# Patient Record
Sex: FEMALE | Race: WHITE | NOT HISPANIC OR LATINO | Employment: FULL TIME | URBAN - METROPOLITAN AREA
[De-identification: names, ages, dates, MRNs, and addresses within clinical notes are randomized per-mention and may not be internally consistent; named-entity substitution may affect disease eponyms.]

---

## 2017-01-24 ENCOUNTER — OFFICE VISIT (OUTPATIENT)
Dept: OBSTETRICS AND GYNECOLOGY | Age: 32
End: 2017-01-24

## 2017-01-24 VITALS
DIASTOLIC BLOOD PRESSURE: 64 MMHG | HEIGHT: 63 IN | BODY MASS INDEX: 22.32 KG/M2 | SYSTOLIC BLOOD PRESSURE: 118 MMHG | WEIGHT: 126 LBS

## 2017-01-24 DIAGNOSIS — Z01.419 WELL WOMAN EXAM WITH ROUTINE GYNECOLOGICAL EXAM: Primary | ICD-10-CM

## 2017-01-24 DIAGNOSIS — N64.3 GALACTORRHEA: ICD-10-CM

## 2017-01-24 PROCEDURE — 99395 PREV VISIT EST AGE 18-39: CPT | Performed by: PHYSICIAN ASSISTANT

## 2017-01-24 NOTE — MR AVS SNAPSHOT
Liza Ko   2017 9:30 AM   Office Visit    Dept Phone:  [phone]   Encounter #:  [id number]    Provider:  EFREM Pollard   Department:  [facility]                Your Full Care Plan              Your Updated Medication List          This list is accurate as of: 17 10:03 AM.  Always use your most recent med list.                citalopram 40 MG tablet   Commonly known as:  CeleXA       CLARITIN 10 MG capsule   Generic drug:  Loratadine       montelukast 10 MG tablet   Commonly known as:  SINGULAIR       PRENATAL VITAMIN PO       PULMICORT IN               We Performed the Following     Prolactin       You Were Diagnosed With        Codes Comments    Well woman exam with routine gynecological exam    -  Primary ICD-10-CM: Z01.419  ICD-9-CM: V72.31     Galactorrhea     ICD-10-CM: O92.6  ICD-9-CM: 611.6       Instructions     None    Patient Instructions History      Upcoming Appointments     Visit Type Date Time Department    WELLNESS 2017  9:30 AM [facility] PIRADHA MENDOZA      [facility] Signup     [facility] allows you to send messages to your doctor, view your test results, renew your prescriptions, schedule appointments, and more. To sign up, go to [employer] and click on the Sign Up Now link in the New User? box. Enter your [employer]acon Activation Code exactly as it appears below along with the last four digits of your Social Security Number and your Date of Birth () to complete the sign-up process. If you do not sign up before the expiration date, you must request a new code.    Veacon Activation Code: [email or web address]  Expires: 2017 10:04 AM    If you have questions, you can email "[email or web address] or call [phone] to talk to our [employer] staff. Remember, [employer] is NOT to be used for urgent needs. For medical emergencies, dial 911.               Other Info from Your Visit           Your  "Appointments     Jan 30, 2018  9:15 AM EST   Wellness with David Pozo MD   [facility] (--)    [address] [facility] [address] [facility] [address] [phone]              Allergies     Mold Extract [Trichophyton]      Nuts      Other      Animal dander    Pollen Extract      Shellfish-derived Products        Reason for Visit     Gynecologic Exam annual exam.      Vital Signs     Blood Pressure Height Weight Last Menstrual Period Breastfeeding? Body Mass Index    118/64 63\" (160 cm) 126 lb (57.2 kg) 01/14/2017 (Exact Date) No 22.32 kg/m2    Smoking Status                   Never Smoker           Problems and Diagnoses Noted     Well woman exam with routine gynecological exam    -  Primary    Milk overproduction            "

## 2017-01-24 NOTE — PROGRESS NOTES
"Subjective     Chief Complaint   Patient presents with   • Gynecologic Exam     annual exam.       History of Present Illness    Liza Ko is a 31 y.o.  who presents for annual exam.  She is doing well.  Completed nursing in the fall, still has occl nipple d/c, milky/yellow.  Does not express it.  Did have HA but those have resolved, no vision changes.  Son is good, on allergy shots    She is in a same sex relationship so does not require contraception    Her menses are regular every 28-30 days, lasting 4-7 days, dysmenorrhea none   Obstetric History:  OB History      Para Term  AB TAB SAB Ectopic Multiple Living    1 1 1       1         Menstrual History:     Patient's last menstrual period was 2017 (exact date).         Current contraception: same sex relationship  History of abnormal Pap smear: no  Received Gardasil immunization: no  Perform regular self breast exam: yes - mthly  Family history of uterine or ovarian cancer: no  Family History of colon cancer: no  Family history of breast cancer: no    Mammogram: not indicated.  Colonoscopy: not indicated.  DEXA: not indicated.    Exercise: moderately active  Calcium/Vitamin D: adequate intake    The following portions of the patient's history were reviewed and updated as appropriate: allergies, current medications, past family history, past medical history, past social history, past surgical history and problem list.    Review of Systems    Pertinent items are noted in HPI.     Objective   Physical Exam    Visit Vitals   • /64   • Ht 63\" (160 cm)   • Wt 126 lb (57.2 kg)   • LMP 2017 (Exact Date)   • Breastfeeding No   • BMI 22.32 kg/m2       General:   alert, appears stated age and cooperative   Neck: no adenopathy and thyroid normal to palpation   Heart: regular rate and rhythm, S1, S2 normal, no murmur, click, rub or gallop and regular rate and rhythm   Lungs: clear to auscultation bilaterally   Abdomen: soft and " nontender   Breast: inspection negative, no nipple discharge or bleeding, no masses or nodularity palpable and unable to express d/c from either nipple   Vulva: normal   Vagina: normal mucosa   Cervix: no lesions   Uterus: normal size, non-tender   Adnexa: normal adnexa and no mass, fullness, tenderness   Rectal: not indicated     Assessment/Plan   Liza was seen today for gynecologic exam.    Diagnoses and all orders for this visit:    Well woman exam with routine gynecological exam    Galactorrhea  -     Prolactin      All questions answered.  Breast self exam technique reviewed and patient encouraged to perform self-exam monthly.  Discussed healthy lifestyle modifications.  Recommended 30 minutes of aerobic exercise five times per week.  Discussed calcium needs to prevent osteoporosis.    Will check prolactin level just to confirm everything is ok.  Advised if nl, that benign galactorrhea can be common and to call if there is a change in type of d/c (black, bloody, green, etc)  Pap utd, due in 2019

## 2017-01-25 ENCOUNTER — TELEPHONE (OUTPATIENT)
Dept: OBSTETRICS AND GYNECOLOGY | Age: 32
End: 2017-01-25

## 2017-01-25 LAB — PROLACTIN SERPL-MCNC: 3.9 NG/ML (ref 4.8–23.3)

## 2017-01-25 NOTE — TELEPHONE ENCOUNTER
----- Message from EFREM Pollard sent at 1/25/2017 10:37 AM EST -----  Let her know her prolactin is wnl, just keep an eye on d/c and call for changes in color

## 2017-02-06 RX ORDER — CITALOPRAM 40 MG/1
TABLET ORAL
Qty: 30 TABLET | Refills: 0 | Status: SHIPPED | OUTPATIENT
Start: 2017-02-06 | End: 2017-03-09 | Stop reason: SDUPTHER

## 2017-03-09 RX ORDER — CITALOPRAM 40 MG/1
TABLET ORAL
Qty: 30 TABLET | Refills: 11 | Status: SHIPPED | OUTPATIENT
Start: 2017-03-09 | End: 2018-01-30 | Stop reason: SDUPTHER

## 2018-01-30 ENCOUNTER — OFFICE VISIT (OUTPATIENT)
Dept: OBSTETRICS AND GYNECOLOGY | Age: 33
End: 2018-01-30

## 2018-01-30 VITALS
DIASTOLIC BLOOD PRESSURE: 72 MMHG | WEIGHT: 126 LBS | HEIGHT: 63 IN | BODY MASS INDEX: 22.32 KG/M2 | SYSTOLIC BLOOD PRESSURE: 118 MMHG

## 2018-01-30 DIAGNOSIS — Z01.419 ENCOUNTER FOR GYNECOLOGICAL EXAMINATION WITHOUT ABNORMAL FINDING: Primary | ICD-10-CM

## 2018-01-30 DIAGNOSIS — Z11.51 SPECIAL SCREENING EXAMINATION FOR HUMAN PAPILLOMAVIRUS (HPV): ICD-10-CM

## 2018-01-30 DIAGNOSIS — Z12.4 ROUTINE CERVICAL SMEAR: ICD-10-CM

## 2018-01-30 PROCEDURE — 99395 PREV VISIT EST AGE 18-39: CPT | Performed by: OBSTETRICS & GYNECOLOGY

## 2018-01-30 RX ORDER — CITALOPRAM 40 MG/1
40 TABLET ORAL DAILY
Qty: 30 TABLET | Refills: 11 | Status: SHIPPED | OUTPATIENT
Start: 2018-01-30 | End: 2019-01-26 | Stop reason: SDUPTHER

## 2018-01-30 NOTE — PROGRESS NOTES
Subjective     Chief Complaint   Patient presents with   • Gynecologic Exam     AC       History of Present Illness    Edith Ko is a 32 y.o.  who presents for annual exam.  The patient is  to E her partner.  They have a son Isidro who is doing well.  Her pregnancy was complicated by fetal intolerance of labor and she had to have a .  Pathology of the placenta showed a markedly small placenta.  Patient does not plan on having any more children.  Her partner also does not plan on more children.  Patient has a history of anxiety but is well maintained on Celexa.  She is also going to therapy.  She does yoga weekly.  She would like to continue on medication.  She has no complaints today.  Her menses are regular every 28-30 days, lasting 4 days, dysmenorrhea none   Obstetric History:  OB History      Para Term  AB Living    1 1 1   1    SAB TAB Ectopic Multiple Live Births        1         Menstrual History:     Patient's last menstrual period was 2018.         Current contraception: lesbian   History of abnormal Pap smear: no  Received Gardasil immunization: no  Perform regular self breast exam: yes  Family history of uterine or ovarian cancer: no  Family History of colon cancer: no  Family history of breast cancer: no    Mammogram: not indicated.  Colonoscopy: not indicated.  DEXA: not indicated.    Exercise: exercises 5 times a week  yoga  Calcium/Vitamin D: adequate intake and uses supplements    The following portions of the patient's history were reviewed and updated as appropriate: allergies, current medications, past family history, past medical history, past social history, past surgical history and problem list.    Review of Systems    Review of Systems   Constitutional: Negative for fatigue.   Respiratory: Negative for shortness of breath.    Gastrointestinal: Negative for abdominal pain.   Genitourinary: Negative for dysuria.   Neurological: Negative  "for headaches.   Psychiatric/Behavioral: Negative for dysphoric mood.         Objective   Physical Exam    /72  Ht 160 cm (63\")  Wt 57.2 kg (126 lb)  LMP 01/27/2018  BMI 22.32 kg/m2    General:   alert, appears stated age and cooperative   Neck: no adenopathy and thyroid normal to palpation   Heart: regular rate and rhythm   Lungs: clear to auscultation bilaterally   Abdomen: soft, non-tender, without masses or organomegaly   Breast: inspection negative, no nipple discharge or bleeding, no masses or nodularity palpable   Vulva: normal, Bartholin's, Urethra, [place]'s normal   Vagina: normal mucosa, normal discharge   Cervix: no cervical motion tenderness and no lesions   Uterus: mobile, non-tender, normal shape and consistency   Adnexa: no mass, fullness, tenderness   Rectal: not indicated     Assessment/Plan   Liza was seen today for gynecologic exam.    Diagnoses and all orders for this visit:    Encounter for gynecological examination without abnormal finding    Other orders  -     citalopram (CeleXA) 40 MG tablet; Take 1 tablet by mouth Daily.  The patient is doing well overall.  I did recommend that she could consider decreasing her Celexa dose to 20 mg or trying a break from Celexa.  She may try that.  Pap smear was sent off today.    All questions answered.  Breast self exam technique reviewed and patient encouraged to perform self-exam monthly.  Discussed healthy lifestyle modifications.  Recommended 30 minutes of aerobic exercise five times per week.  Discussed calcium needs to prevent osteoporosis.                 "

## 2018-02-01 ENCOUNTER — TELEPHONE (OUTPATIENT)
Dept: OBSTETRICS AND GYNECOLOGY | Age: 33
End: 2018-02-01

## 2018-02-01 LAB
CYTOLOGIST CVX/VAG CYTO: NORMAL
CYTOLOGY CVX/VAG DOC THIN PREP: NORMAL
DX ICD CODE: NORMAL
HIV 1 & 2 AB SER-IMP: NORMAL
HPV I/H RISK 4 DNA CVX QL PROBE+SIG AMP: NEGATIVE
OTHER STN SPEC: NORMAL
PATH REPORT.FINAL DX SPEC: NORMAL
STAT OF ADQ CVX/VAG CYTO-IMP: NORMAL

## 2019-01-28 RX ORDER — CITALOPRAM 40 MG/1
TABLET ORAL
Qty: 90 TABLET | Refills: 0 | Status: SHIPPED | OUTPATIENT
Start: 2019-01-28 | End: 2019-02-01 | Stop reason: SDUPTHER

## 2019-02-01 ENCOUNTER — OFFICE VISIT (OUTPATIENT)
Dept: OBSTETRICS AND GYNECOLOGY | Age: 34
End: 2019-02-01

## 2019-02-01 VITALS
WEIGHT: 135 LBS | BODY MASS INDEX: 23.05 KG/M2 | DIASTOLIC BLOOD PRESSURE: 70 MMHG | SYSTOLIC BLOOD PRESSURE: 120 MMHG | HEIGHT: 64 IN

## 2019-02-01 DIAGNOSIS — Z01.419 ENCOUNTER FOR GYNECOLOGICAL EXAMINATION WITHOUT ABNORMAL FINDING: Primary | ICD-10-CM

## 2019-02-01 DIAGNOSIS — F41.9 ANXIETY: ICD-10-CM

## 2019-02-01 PROCEDURE — 99395 PREV VISIT EST AGE 18-39: CPT | Performed by: OBSTETRICS & GYNECOLOGY

## 2019-02-01 RX ORDER — ERGOCALCIFEROL (VITAMIN D2) 10 MCG
400 TABLET ORAL DAILY
COMMUNITY

## 2019-02-01 RX ORDER — CITALOPRAM 40 MG/1
40 TABLET ORAL DAILY
Qty: 90 TABLET | Refills: 3 | Status: SHIPPED | OUTPATIENT
Start: 2019-02-01 | End: 2022-06-30 | Stop reason: SDUPTHER

## 2019-02-01 NOTE — PROGRESS NOTES
Subjective     Chief Complaint   Patient presents with   • Annual Exam     PT HERE FOR ROUTINE AE, SHE IS WELL WITH NO COMPLAINTS. LAST PAP 2018 NEG AND NEG HPV. NOTHING FOR BC.         History of Present Illness    Edith Ko is a 33 y.o.  who presents for annual exam.  The patient has a son.  She is  to her partner Bossman  Patient had a  section for fetal heart rate decelerations.  She was found to have a very small placenta.  She does have a anxiety that is well controlled with Celexa.  Patient is doing hot yoga for exercise.  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  She has no complaints.  She does get blood work done at work and it was normal.  Her menses are regular every 28-30 days, lasting 4-7 days, dysmenorrhea none   Obstetric History:  OB History      Para Term  AB Living    1 1 1     1    SAB TAB Ectopic Molar Multiple Live Births              1         Menstrual History:     Patient's last menstrual period was 2019.         Current contraception: none  History of abnormal Pap smear: no  Received Gardasil immunization: no  Perform regular self breast exam: yes  Family history of uterine or ovarian cancer: no  Family History of colon cancer: no  Family history of breast cancer: no    Mammogram: not indicated.  Colonoscopy: not indicated.  DEXA: not indicated.    Exercise: exercises 5 times a week hot yoga   Calcium/Vitamin D: adequate intake and uses supplements    The following portions of the patient's history were reviewed and updated as appropriate: allergies, current medications, past family history, past medical history, past social history, past surgical history and problem list.    Review of Systems    Review of Systems   Constitutional: Negative for fatigue.   Respiratory: Negative for shortness of breath.    Gastrointestinal: Negative for abdominal pain.   Genitourinary: Negative for dysuria.   Neurological: Negative for headaches.  "  Psychiatric/Behavioral: Negative for dysphoric mood.         Objective   Physical Exam    /70   Ht 162.6 cm (64\")   Wt 61.2 kg (135 lb)   LMP 01/20/2019   BMI 23.17 kg/m²     General:   alert, appears stated age and cooperative   Neck: no asymmetry, masses, or scars and no adenopathy   Heart: regular rate and rhythm   Lungs: clear to auscultation bilaterally   Abdomen: soft, non-tender, without masses or organomegaly   Breast: inspection negative, no nipple discharge or bleeding, no masses or nodularity palpable   Vulva: normal, Bartholin's, Urethra, [place]'s normal   Vagina: normal mucosa, normal discharge   Cervix: no cervical motion tenderness and no lesions   Uterus: mobile, non-tender, normal shape and consistency   Adnexa: no mass, fullness, tenderness   Rectal: not indicated     Assessment/Plan   Liza was seen today for annual exam.    Diagnoses and all orders for this visit:    Encounter for gynecological examination without abnormal finding    Patient is doing well.  Her Pap smear is up-to-date.  It was normal last year.  We discussed trying to limit alcohol intake to no more than 4 week.  Patient is doing well and exercising regularly.  She will continue her Celexa for anxiety.    All questions answered.  Breast self exam technique reviewed and patient encouraged to perform self-exam monthly.  Discussed healthy lifestyle modifications.  Recommended 30 minutes of aerobic exercise five times per week.  Discussed calcium needs to prevent osteoporosis.                 "

## 2020-03-12 ENCOUNTER — OFFICE VISIT (OUTPATIENT)
Dept: OBSTETRICS AND GYNECOLOGY | Age: 35
End: 2020-03-12

## 2020-03-12 VITALS
HEIGHT: 64 IN | WEIGHT: 126 LBS | DIASTOLIC BLOOD PRESSURE: 74 MMHG | BODY MASS INDEX: 21.51 KG/M2 | SYSTOLIC BLOOD PRESSURE: 104 MMHG

## 2020-03-12 DIAGNOSIS — Z01.419 ENCOUNTER FOR GYNECOLOGICAL EXAMINATION WITHOUT ABNORMAL FINDING: Primary | ICD-10-CM

## 2020-03-12 DIAGNOSIS — Z11.51 SPECIAL SCREENING EXAMINATION FOR HUMAN PAPILLOMAVIRUS (HPV): ICD-10-CM

## 2020-03-12 DIAGNOSIS — Z12.4 SCREENING FOR MALIGNANT NEOPLASM OF THE CERVIX: ICD-10-CM

## 2020-03-12 PROCEDURE — 99395 PREV VISIT EST AGE 18-39: CPT | Performed by: OBSTETRICS & GYNECOLOGY

## 2020-03-12 RX ORDER — UBIDECARENONE 75 MG
50 CAPSULE ORAL DAILY
COMMUNITY
End: 2021-06-07

## 2020-03-12 NOTE — PROGRESS NOTES
Subjective     Chief Complaint   Patient presents with   • Gynecologic Exam     AC. Last pap 2018 normal.       History of Present Illness    Liza Ko is a 34 y.o.  who presents for annual exam.  The patient is doing well.  She is still working for [employer].  Her son is about 5 years old.  She is in a lesbian relationship.  She and her partner do not plan on having more children.  Her cycles are regular and not bothering her.  She takes Celexa for anxiety.  There is a history of osteoporosis in her family but no history of fractures.  She is taking vitamin D and getting calcium in her diet.  She usually runs but had a strain in her foot.  She is now doing hot yoga.  Her menses are regular every 28-30 days, lasting 3-4 days , dysmenorrhea none   Obstetric History:  OB History        1    Para   1    Term   1            AB        Living   1       SAB        TAB        Ectopic        Molar        Multiple        Live Births   1               Menstrual History:     Patient's last menstrual period was 2020.         Current contraception: none  History of abnormal Pap smear: no  Received Gardasil immunization: no  Perform regular self breast exam : yes  Family history of uterine or ovarian cancer: no  Family History of colon cancer: no  Family history of breast cancer: no    Mammogram: not indicated.  Colonoscopy: not indicated.  DEXA: not indicated.    Exercise: exercises 5 times a week hot yoga    Calcium/Vitamin D: adequate intake and uses supplements    The following portions of the patient's history were reviewed and updated as appropriate: allergies, current medications, past family history, past medical history, past social history, past surgical history and problem list.    Review of Systems    Review of Systems   Constitutional: Negative for fatigue.   Respiratory: Negative for shortness of breath.    Gastrointestinal: Negative for abdominal pain.  "  Genitourinary: Negative for dysuria.   Neurological: Negative for headaches.   Psychiatric/Behavioral: Negative for dysphoric mood.         Objective   Physical Exam    /74   Ht 162.6 cm (64\")   Wt 57.2 kg (126 lb)   LMP 02/19/2020   Breastfeeding No   BMI 21.63 kg/m²     General:   alert, appears stated age and cooperative   Neck: thyroid normal to palpation   Heart: regular rate and rhythm   Lungs: clear to auscultation bilaterally   Abdomen: soft, non-tender, without masses or organomegaly   Breast: inspection negative, no nipple discharge or bleeding, no masses or nodularity palpable   Vulva: normal, Bartholin's, Urethra, [place]'s normal   Vagina: normal mucosa, normal discharge   Cervix: no cervical motion tenderness and no lesions   Uterus: non-tender, normal shape and consistency, uterus is very anteverted.   Adnexa: no mass, fullness, tenderness   Rectal: not indicated     Assessment/Plan   Liza was seen today for gynecologic exam.    Diagnoses and all orders for this visit:    Encounter for gynecological examination without abnormal finding  -     IGP, Aptima HPV, Rfx 16 / 18,45    Screening for malignant neoplasm of the cervix  -     IGP, Aptima HPV, Rfx 16 / 18,45    Special screening examination for human papillomavirus (HPV)  -     IGP, Aptima HPV, Rfx 16 / 18,45      Patient is doing well overall.  All questions answered.  Breast self exam technique reviewed and patient encouraged to perform self-exam monthly.  Discussed healthy lifestyle modifications.  Recommended 30 minutes of aerobic exercise five times per week.  Discussed calcium needs to prevent osteoporosis.                 "

## 2020-03-16 LAB
CYTOLOGIST CVX/VAG CYTO: NORMAL
CYTOLOGY CVX/VAG DOC CYTO: NORMAL
CYTOLOGY CVX/VAG DOC THIN PREP: NORMAL
DX ICD CODE: NORMAL
HIV 1 & 2 AB SER-IMP: NORMAL
HPV I/H RISK 4 DNA CVX QL PROBE+SIG AMP: NEGATIVE
OTHER STN SPEC: NORMAL
STAT OF ADQ CVX/VAG CYTO-IMP: NORMAL

## 2020-03-19 ENCOUNTER — TELEPHONE (OUTPATIENT)
Dept: OBSTETRICS AND GYNECOLOGY | Age: 35
End: 2020-03-19

## 2020-03-19 NOTE — TELEPHONE ENCOUNTER
----- Message from David Pozo MD sent at 3/16/2020  6:29 PM EDT -----  Please notify pap is normal.

## 2021-06-07 ENCOUNTER — OFFICE VISIT (OUTPATIENT)
Dept: OBSTETRICS AND GYNECOLOGY | Age: 36
End: 2021-06-07

## 2021-06-07 VITALS
SYSTOLIC BLOOD PRESSURE: 108 MMHG | WEIGHT: 133 LBS | DIASTOLIC BLOOD PRESSURE: 72 MMHG | HEIGHT: 63 IN | BODY MASS INDEX: 23.57 KG/M2

## 2021-06-07 DIAGNOSIS — Z01.419 ENCOUNTER FOR GYNECOLOGICAL EXAMINATION WITHOUT ABNORMAL FINDING: Primary | ICD-10-CM

## 2021-06-07 PROCEDURE — 99395 PREV VISIT EST AGE 18-39: CPT | Performed by: OBSTETRICS & GYNECOLOGY

## 2021-06-07 RX ORDER — DEXAMETHASONE 4 MG/1
TABLET ORAL
COMMUNITY
Start: 2021-05-26

## 2021-06-07 NOTE — PROGRESS NOTES
Subjective     Chief Complaint   Patient presents with   • Gynecologic Exam     AC       History of Present Illness    Edith Ko is a 35 y.o.  who presents for annual exam.  Patient had a difficult year due to Covid.  She works for [employer] and had to work from home.  Her son Jamie will be starting the first grade.  Patient is in a lesbian relationship.  She and her partner do not plan on more children.  Cycles have been regular.  Patient is running for exercise.  She notes her cycles are light.  Patient had some PVCs that were worked up.  They were thought to be due to stress.  Patient is on Celexa for anxiety.  Her menses are regular every 28-30 days, lasting 0-3 days, dysmenorrhea none   Obstetric History:  OB History        1    Para   1    Term   1            AB        Living   1       SAB        TAB        Ectopic        Molar        Multiple        Live Births   1               Menstrual History:     Patient's last menstrual period was 2021.         Current contraception: none  History of abnormal Pap smear: no  Received Gardasil immunization: no  Perform regular self breast exam : yes  Family history of uterine or ovarian cancer: no  Family History of colon cancer: no  Family history of breast cancer: no    Mammogram: not indicated.  Colonoscopy: not indicated.  DEXA: not indicated.    Exercise: Running   Calcium/Vitamin D: adequate intake and uses supplements    The following portions of the patient's history were reviewed and updated as appropriate: allergies, current medications, past family history, past medical history, past social history, past surgical history and problem list.    Review of Systems    Review of Systems   Constitutional: Negative for fatigue.   Respiratory: Negative for shortness of breath.    Gastrointestinal: Negative for abdominal pain.   Genitourinary: Negative for dysuria.   Neurological: Negative for headaches.  "  Psychiatric/Behavioral: Negative for dysphoric mood.     Objective   Physical Exam    /72   Ht 160 cm (63\")   Wt 60.3 kg (133 lb)   LMP 05/25/2021   Breastfeeding No   BMI 23.56 kg/m²     General:   alert, appears stated age and cooperative   Neck: thyroid normal to palpation   Heart: regular rate and rhythm   Lungs: clear to auscultation bilaterally   Abdomen: soft, non-tender, without masses or organomegaly   Breast: inspection negative, no nipple discharge or bleeding, no masses or nodularity palpable   Vulva: normal, Bartholin's, Urethra, [place]'s normal   Vagina: normal mucosa, normal discharge   Cervix: no cervical motion tenderness and no lesions   Uterus: non-tender, normal shape and consistency   Adnexa: no mass, fullness, tenderness   Rectal: not indicated     Assessment/Plan   Diagnoses and all orders for this visit:    1. Encounter for gynecological examination without abnormal finding (Primary)      Patient's Pap smear is up-to-date.  It was normal last year.    All questions answered.  Breast self exam technique reviewed and patient encouraged to perform self-exam monthly.  Discussed healthy lifestyle modifications.  Recommended 30 minutes of aerobic exercise five times per week.  Discussed calcium needs to prevent osteoporosis.                     "

## 2021-10-08 NOTE — TELEPHONE ENCOUNTER
Pt not'd of results   Your workup today does not show any emergent findings.  Monitor your symptoms and try to stay hydrated.      Your hemoglobin is low but looks stable compared to prior.      If you have any new or worsening symptoms, return to the ER - including dizziness, headaches, abdominal pain, vaginal bleeding.

## 2021-11-15 ENCOUNTER — OFFICE VISIT (OUTPATIENT)
Dept: OBSTETRICS AND GYNECOLOGY | Age: 36
End: 2021-11-15

## 2021-11-15 VITALS
DIASTOLIC BLOOD PRESSURE: 60 MMHG | HEIGHT: 63 IN | SYSTOLIC BLOOD PRESSURE: 102 MMHG | WEIGHT: 133 LBS | BODY MASS INDEX: 23.57 KG/M2

## 2021-11-15 DIAGNOSIS — R10.31 RIGHT LOWER QUADRANT ABDOMINAL PAIN: Primary | ICD-10-CM

## 2021-11-15 DIAGNOSIS — R39.15 URINARY URGENCY: ICD-10-CM

## 2021-11-15 DIAGNOSIS — Z13.89 SCREENING FOR BLOOD OR PROTEIN IN URINE: ICD-10-CM

## 2021-11-15 LAB
BILIRUB BLD-MCNC: NEGATIVE MG/DL
CLARITY, POC: CLEAR
COLOR UR: YELLOW
GLUCOSE UR STRIP-MCNC: NEGATIVE MG/DL
KETONES UR QL: NEGATIVE
LEUKOCYTE EST, POC: NEGATIVE
NITRITE UR-MCNC: NEGATIVE MG/ML
PH UR: 6 [PH] (ref 5–8)
PROT UR STRIP-MCNC: NEGATIVE MG/DL
RBC # UR STRIP: ABNORMAL /UL
SP GR UR: 1.01 (ref 1–1.03)
UROBILINOGEN UR QL: NORMAL

## 2021-11-15 PROCEDURE — 81002 URINALYSIS NONAUTO W/O SCOPE: CPT | Performed by: NURSE PRACTITIONER

## 2021-11-15 PROCEDURE — 99213 OFFICE O/P EST LOW 20 MIN: CPT | Performed by: NURSE PRACTITIONER

## 2021-11-15 NOTE — PROGRESS NOTES
"Subjective     Chief Complaint   Patient presents with   • Gynecologic Exam     right abdominal pain,  urgency to urine       Liza Ko is a 36 y.o.  whose LMP is Patient's last menstrual period was 11/15/2021.     Pt presents today with chief complaint of right lower quadrant abdominal pain  She states the pain started yesterday morning and was quite severe  She was having dry heaving associated with the pain  She states it lasted a couple of hours and then resolved  All localized to RLQ  Hx of appendectomy  She started her period the next morning  She is no longer having any pain   She also noted urinary urgency  Denies dysuria or frequency  Does not some pelvic pressure  U/S today shows AV uterus  ET 4.02 mm   Normal ovaries  No FF noted on CDS  She is in a lesbian relationship so no contraception  She reports her cycles are regular and periods are light   Pt of Dr. Pozo    No Additional Complaints Reported    The following portions of the patient's history were reviewed and updated as appropriate:vital signs, allergies, current medications, past medical history, past social history, past surgical history and problem list      Review of Systems   Pertinent items are noted in HPI.     Objective      /60   Ht 160 cm (63\")   Wt 60.3 kg (133 lb)   LMP 11/15/2021   BMI 23.56 kg/m²     Physical Exam    General:   alert and no distress   Heart: Not performed today   Lungs: Not performed today.   Breast: na   Neck: na   Abdomen: {soft, non-tender. Bowel sounds normal. No masses,  no organomegaly   CVA: Not performed today   Pelvis: Not performed today   Extremities: Not performed today   Neurologic: negative   Psychiatric: Normal affect, judgement, and mood       Lab Review   Labs: No data reviewed     Imaging   Ultrasound - Pelvic Vaginal    Assessment/Plan     ASSESSMENT  1. Right lower quadrant abdominal pain    2. Screening for blood or protein in urine    3. Urinary urgency        PLAN  1. "   Orders Placed This Encounter   Procedures   • POC Urinalysis Dipstick       2. Medications prescribed this encounter:      No orders of the defined types were placed in this encounter.      3. U/S reassuring. Possible ruptured ovarian cyst. Pain has completely resolved. Will culture urine to r/o UTI.     Follow up: ANDREE Soriano, SHADI  11/15/2021

## 2021-11-17 LAB
BACTERIA UR CULT: NORMAL
BACTERIA UR CULT: NORMAL

## 2022-06-30 ENCOUNTER — OFFICE VISIT (OUTPATIENT)
Dept: OBSTETRICS AND GYNECOLOGY | Age: 37
End: 2022-06-30

## 2022-06-30 VITALS
DIASTOLIC BLOOD PRESSURE: 74 MMHG | WEIGHT: 136 LBS | HEIGHT: 63 IN | BODY MASS INDEX: 24.1 KG/M2 | SYSTOLIC BLOOD PRESSURE: 108 MMHG

## 2022-06-30 DIAGNOSIS — Z01.419 ENCOUNTER FOR GYNECOLOGICAL EXAMINATION WITHOUT ABNORMAL FINDING: Primary | ICD-10-CM

## 2022-06-30 DIAGNOSIS — F41.9 ANXIETY: ICD-10-CM

## 2022-06-30 PROCEDURE — 99395 PREV VISIT EST AGE 18-39: CPT | Performed by: OBSTETRICS & GYNECOLOGY

## 2022-06-30 RX ORDER — MONTELUKAST SODIUM 10 MG/1
TABLET ORAL
COMMUNITY
Start: 2021-07-01

## 2022-06-30 RX ORDER — DIPHENOXYLATE HYDROCHLORIDE AND ATROPINE SULFATE 2.5; .025 MG/1; MG/1
TABLET ORAL DAILY
COMMUNITY

## 2022-06-30 RX ORDER — CITALOPRAM 40 MG/1
40 TABLET ORAL DAILY
Qty: 90 TABLET | Refills: 3 | Status: SHIPPED | OUTPATIENT
Start: 2022-06-30

## 2022-06-30 NOTE — PROGRESS NOTES
Subjective     Chief Complaint   Patient presents with   • Gynecologic Exam     AC.       History of Present Illness    Edith Ko is a 36 y.o.  who presents for annual exam.  The patient still working at [employer].  She has been running several races this year.  She is doing well overall.  Her child is now in the second grade.  Patient is in a lesbian relationship.  Celexa is doing well to control anxiety.  She does feel her ovulation more prominently.  The pain is not too bad except for on 1 occasion that she had more severe pain and saw the nurse practitioner.  Ultrasound was normal in November.  Her menses are regular every 28-30 days, lasting 3 days 1 heavy , dysmenorrhea none   Obstetric History:  OB History        1    Para   1    Term   1            AB        Living   1       SAB        IAB        Ectopic        Molar        Multiple        Live Births   1               Menstrual History:     Patient's last menstrual period was 2022.         Current contraception: lesbian  History of abnormal Pap smear: no  Received Gardasil immunization: no  Perform regular self breast exam : yes  Family history of uterine or ovarian cancer: no  Family History of colon cancer: no  Family history of breast cancer: no    Mammogram: not indicated.  Colonoscopy: not indicated.  DEXA: not indicated.    Exercise: runs 20 miles a weeks   Calcium/Vitamin D: adequate intake and uses supplements    The following portions of the patient's history were reviewed and updated as appropriate: allergies, current medications, past family history, past medical history, past social history, past surgical history and problem list.    Review of Systems    Review of Systems   Constitutional: Negative for fatigue.   Respiratory: Negative for shortness of breath.    Gastrointestinal: Negative for abdominal pain.   Genitourinary: Negative for dysuria.   Neurological: Negative for headaches.  "  Psychiatric/Behavioral: Negative for dysphoric mood.     Objective   Physical Exam    /74   Ht 160 cm (63\")   Wt 61.7 kg (136 lb)   LMP 06/05/2022   Breastfeeding No   BMI 24.09 kg/m²     General:   alert, appears stated age and cooperative   Neck: thyroid normal to palpation   Heart: regular rate and rhythm   Lungs: clear to auscultation bilaterally   Abdomen: soft, non-tender, without masses or organomegaly   Breast: inspection negative, no nipple discharge or bleeding, no masses or nodularity palpable   Vulva: normal, Bartholin's, Urethra, [place]'s normal   Vagina: normal mucosa, normal discharge   Cervix: no cervical motion tenderness and no lesions   Uterus: non-tender, normal shape and consistency   Adnexa: no mass, fullness, tenderness   Rectal: not indicated     Assessment & Plan   Diagnoses and all orders for this visit:    1. Encounter for gynecological examination without abnormal finding (Primary)    2. Anxiety    Other orders  -     citalopram (CeleXA) 40 MG tablet; Take 1 tablet by mouth Daily.  Dispense: 90 tablet; Refill: 3    Pap smear is up-to-date  Continue Celexa  We discussed ovulation pain.  Patient will take NSAIDs as needed.    All questions answered.  Breast self exam technique reviewed and patient encouraged to perform self-exam monthly.  Discussed healthy lifestyle modifications.  Recommended 30 minutes of aerobic exercise five times per week.  Discussed calcium needs to prevent osteoporosis.                     "

## 2023-02-14 ENCOUNTER — TELEPHONE (OUTPATIENT)
Dept: OBSTETRICS AND GYNECOLOGY | Age: 38
End: 2023-02-14
Payer: COMMERCIAL

## 2023-02-14 NOTE — TELEPHONE ENCOUNTER
Provider: DR. 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       When was the patient last seen: 06/30/22  When did it start: YESTERDAY (02/13)  Where is it located: LEFT BREAST   Characteristics of symptom/severity: NA  Timing- Is it constant or intermittent: NA  What makes it worse: NA  What makes it better: NA  What therapies/medications have you tried: NA     PT CALLED AFTER 4:30PM -   OK TO CALL BACK ANYTIME, OK TO LEAVE A .

## 2023-07-24 RX ORDER — CITALOPRAM 40 MG/1
TABLET ORAL
Qty: 90 TABLET | Refills: 3 | Status: SHIPPED | OUTPATIENT
Start: 2023-07-24

## 2024-08-15 ENCOUNTER — OFFICE VISIT (OUTPATIENT)
Dept: OBSTETRICS AND GYNECOLOGY | Age: 39
End: 2024-08-15
Payer: COMMERCIAL

## 2024-08-15 VITALS
BODY MASS INDEX: 24.41 KG/M2 | WEIGHT: 137.8 LBS | HEIGHT: 63 IN | DIASTOLIC BLOOD PRESSURE: 70 MMHG | SYSTOLIC BLOOD PRESSURE: 108 MMHG

## 2024-08-15 DIAGNOSIS — Z12.31 ENCOUNTER FOR SCREENING MAMMOGRAM FOR MALIGNANT NEOPLASM OF BREAST: ICD-10-CM

## 2024-08-15 DIAGNOSIS — Z01.419 ENCOUNTER FOR GYNECOLOGICAL EXAMINATION WITHOUT ABNORMAL FINDING: Primary | ICD-10-CM

## 2024-08-15 NOTE — PROGRESS NOTES
"Subjective     Chief Complaint   Patient presents with    Gynecologic Exam     Annual exam, Last Pap 2023 NEG, HPV NEG, Pt has no complaints today, Doing well        History of Present Illness  Edith Ko is a 38 y.o.  who presents for annual exam.  Patient is doing well.  Her son Is now 10 years old.  He is planning competitive soccer.  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  Her menses are regular every 28-30 days, lasting  4 days light  , dysmenorrhea none   Obstetric History:  OB History          1    Para   1    Term   1            AB        Living   1         SAB        IAB        Ectopic        Molar        Multiple        Live Births   1               Menstrual History:     Patient's last menstrual period was 2024 (exact date).         Current contraception:  lesbain  History of abnormal Pap smear: no  Received Gardasil immunization: no  Perform regular self breast exam : yes  Family history of uterine or ovarian cancer: no  Family History of colon cancer: no  Family history of breast cancer: no    Mammogram: not indicated.  Colonoscopy: not indicated.  DEXA: not indicated.    Exercise: runs 4 times a week and yoga   Calcium/Vitamin D: adequate intake    The following portions of the patient's history were reviewed and updated as appropriate: allergies, current medications, past family history, past medical history, past social history, past surgical history, and problem list.    Review of Systems        Objective   Physical Exam    /70   Ht 160 cm (63\")   Wt 62.5 kg (137 lb 12.8 oz)   LMP 2024 (Exact Date)   BMI 24.41 kg/m²     General:   alert, appears stated age and cooperative   Neck: thyroid normal to palpation   Heart: regular rate and rhythm   Lungs: clear to auscultation bilaterally   Abdomen: soft, non-tender, without masses or organomegaly   Breast: inspection negative, no nipple discharge or bleeding, no " masses or nodularity palpable   Vulva: normal, Bartholin's, Urethra, [place]'s normal   Vagina: normal mucosa, normal discharge   Cervix: no cervical motion tenderness and no lesions   Uterus: non-tender, normal shape and consistency   Adnexa: no mass, fullness, tenderness   Rectal: not indicated     Assessment & Plan   Diagnoses and all orders for this visit:    1. Encounter for gynecological examination without abnormal finding (Primary)    Pap is up-to-date  Start mammograms next year.    All questions answered.  Breast self exam technique reviewed and patient encouraged to perform self-exam monthly.  Discussed healthy lifestyle modifications.  Recommended 30 minutes of aerobic exercise five times per week.  Discussed calcium needs to prevent osteoporosis.